# Patient Record
Sex: MALE | Race: BLACK OR AFRICAN AMERICAN | URBAN - NONMETROPOLITAN AREA
[De-identification: names, ages, dates, MRNs, and addresses within clinical notes are randomized per-mention and may not be internally consistent; named-entity substitution may affect disease eponyms.]

---

## 2024-09-05 ENCOUNTER — LAB OUTSIDE AN ENCOUNTER (OUTPATIENT)
Dept: URBAN - NONMETROPOLITAN AREA CLINIC 4 | Facility: CLINIC | Age: 49
End: 2024-09-05

## 2024-09-05 ENCOUNTER — OFFICE VISIT (OUTPATIENT)
Dept: URBAN - NONMETROPOLITAN AREA CLINIC 4 | Facility: CLINIC | Age: 49
End: 2024-09-05
Payer: COMMERCIAL

## 2024-09-05 VITALS — WEIGHT: 307 LBS | HEIGHT: 72 IN | BODY MASS INDEX: 41.58 KG/M2 | TEMPERATURE: 97.5 F

## 2024-09-05 DIAGNOSIS — K64.8 INTERNAL HEMORRHOIDS WITH COMPLICATION: ICD-10-CM

## 2024-09-05 DIAGNOSIS — K63.5 SESSILE COLONIC POLYP: ICD-10-CM

## 2024-09-05 DIAGNOSIS — K92.1 BLOOD IN THE STOOL: ICD-10-CM

## 2024-09-05 PROCEDURE — 99214 OFFICE O/P EST MOD 30 MIN: CPT | Performed by: REGISTERED NURSE

## 2024-09-05 RX ORDER — MINERAL OIL, PETROLATUM, PHENYLEPHRINE HCL 2.5; 140; 749 MG/G; MG/G; MG/G
AS DIRECTED OINTMENT TOPICAL BID
Qty: 1 | Refills: 0 | Status: ACTIVE | COMMUNITY

## 2024-09-05 RX ORDER — AMLODIPINE BESYLATE 10 MG/1
1 TABLET TABLET ORAL ONCE A DAY
COMMUNITY

## 2024-09-05 RX ORDER — HYDROCHLOROTHIAZIDE 12.5 MG/1
1 CAPSULE IN THE MORNING CAPSULE ORAL ONCE A DAY
COMMUNITY

## 2024-09-05 RX ORDER — ATENOLOL 25 MG/1
1 TABLET TABLET ORAL ONCE A DAY
COMMUNITY

## 2024-09-05 NOTE — HPI-TODAY'S VISIT:
6/8/21: Patient reports that he has had recurrent flares of hemorrhoids over the past 4-5 month. Pt reports that he has itching, bleeding, worse with preparaton H. Pt reports that he keeps the area clean as much as possible. Pt reports that his stools are normal and have not changed. Pt reports that he has higher vegetable intake. Pt reports that he had c scope with Dr. Martinez in March. Had adenomatous which was resected.  9/9/21: Colonoscopy '21 int hemorrhoids, diverticulosis w Dr. Martinez. Hemorrhoid banding with Dr. Fernandez, helped for while then brbpr recurred. Denies constipation.  9/5/24: Pt RTC with c/o intermittent blood in stool for past 2-3 months. Denies rectal pain. Denies constipation, but he admits to straining and sitting on toilet for long time. He had hemorrhoid banding twice in 2021. Last cscope in 2021 revealed large sessile polyp removed.

## 2024-10-02 ENCOUNTER — OFFICE VISIT (OUTPATIENT)
Dept: URBAN - METROPOLITAN AREA SURGERY CENTER 13 | Facility: SURGERY CENTER | Age: 49
End: 2024-10-02

## 2024-10-17 ENCOUNTER — OFFICE VISIT (OUTPATIENT)
Dept: URBAN - NONMETROPOLITAN AREA CLINIC 4 | Facility: CLINIC | Age: 49
End: 2024-10-17

## 2024-10-17 RX ORDER — ATENOLOL 25 MG/1
1 TABLET TABLET ORAL ONCE A DAY
COMMUNITY

## 2024-10-17 RX ORDER — HYDROCHLOROTHIAZIDE 12.5 MG/1
1 CAPSULE IN THE MORNING CAPSULE ORAL ONCE A DAY
COMMUNITY

## 2024-10-17 RX ORDER — MINERAL OIL, PETROLATUM, PHENYLEPHRINE HCL 2.5; 140; 749 MG/G; MG/G; MG/G
AS DIRECTED OINTMENT TOPICAL BID
Qty: 1 | Refills: 0 | COMMUNITY

## 2024-10-17 RX ORDER — AMLODIPINE BESYLATE 10 MG/1
1 TABLET TABLET ORAL ONCE A DAY
COMMUNITY